# Patient Record
Sex: FEMALE | Race: WHITE | HISPANIC OR LATINO
[De-identification: names, ages, dates, MRNs, and addresses within clinical notes are randomized per-mention and may not be internally consistent; named-entity substitution may affect disease eponyms.]

---

## 2017-08-23 PROBLEM — Z00.00 ENCOUNTER FOR PREVENTIVE HEALTH EXAMINATION: Status: ACTIVE | Noted: 2017-08-23

## 2017-09-01 ENCOUNTER — APPOINTMENT (OUTPATIENT)
Dept: GASTROENTEROLOGY | Facility: CLINIC | Age: 42
End: 2017-09-01

## 2019-04-18 ENCOUNTER — EMERGENCY (EMERGENCY)
Facility: HOSPITAL | Age: 44
LOS: 1 days | Discharge: LEFT BEFORE DISCHARGE | End: 2019-04-18
Attending: EMERGENCY MEDICINE | Admitting: INTERNAL MEDICINE
Payer: COMMERCIAL

## 2019-04-18 ENCOUNTER — TRANSCRIPTION ENCOUNTER (OUTPATIENT)
Age: 44
End: 2019-04-18

## 2019-04-18 VITALS
RESPIRATION RATE: 78 BRPM | SYSTOLIC BLOOD PRESSURE: 135 MMHG | TEMPERATURE: 97 F | OXYGEN SATURATION: 95 % | DIASTOLIC BLOOD PRESSURE: 94 MMHG | HEART RATE: 117 BPM

## 2019-04-18 VITALS
OXYGEN SATURATION: 99 % | HEART RATE: 73 BPM | RESPIRATION RATE: 16 BRPM | DIASTOLIC BLOOD PRESSURE: 77 MMHG | SYSTOLIC BLOOD PRESSURE: 116 MMHG

## 2019-04-18 DIAGNOSIS — R07.9 CHEST PAIN, UNSPECIFIED: ICD-10-CM

## 2019-04-18 LAB
ALBUMIN SERPL ELPH-MCNC: 4.6 G/DL — SIGNIFICANT CHANGE UP (ref 3.3–5)
ALP SERPL-CCNC: 35 U/L — LOW (ref 40–120)
ALT FLD-CCNC: 7 U/L — SIGNIFICANT CHANGE UP (ref 4–33)
ANION GAP SERPL CALC-SCNC: 18 MMO/L — HIGH (ref 7–14)
APTT BLD: 28.1 SEC — SIGNIFICANT CHANGE UP (ref 27.5–36.3)
AST SERPL-CCNC: 19 U/L — SIGNIFICANT CHANGE UP (ref 4–32)
BASE EXCESS BLDV CALC-SCNC: -1.3 MMOL/L — SIGNIFICANT CHANGE UP
BASE EXCESS BLDV CALC-SCNC: -2.3 MMOL/L — SIGNIFICANT CHANGE UP
BASOPHILS # BLD AUTO: 0.04 K/UL — SIGNIFICANT CHANGE UP (ref 0–0.2)
BASOPHILS NFR BLD AUTO: 0.3 % — SIGNIFICANT CHANGE UP (ref 0–2)
BILIRUB SERPL-MCNC: 0.4 MG/DL — SIGNIFICANT CHANGE UP (ref 0.2–1.2)
BLOOD GAS VENOUS - CREATININE: 0.6 MG/DL — SIGNIFICANT CHANGE UP (ref 0.5–1.3)
BLOOD GAS VENOUS - CREATININE: SIGNIFICANT CHANGE UP MG/DL (ref 0.5–1.3)
BUN SERPL-MCNC: 8 MG/DL — SIGNIFICANT CHANGE UP (ref 7–23)
CALCIUM SERPL-MCNC: 9.1 MG/DL — SIGNIFICANT CHANGE UP (ref 8.4–10.5)
CHLORIDE BLDV-SCNC: 104 MMOL/L — SIGNIFICANT CHANGE UP (ref 96–108)
CHLORIDE BLDV-SCNC: 108 MMOL/L — SIGNIFICANT CHANGE UP (ref 96–108)
CHLORIDE SERPL-SCNC: 101 MMOL/L — SIGNIFICANT CHANGE UP (ref 98–107)
CO2 SERPL-SCNC: 17 MMOL/L — LOW (ref 22–31)
CREAT SERPL-MCNC: 0.56 MG/DL — SIGNIFICANT CHANGE UP (ref 0.5–1.3)
EOSINOPHIL # BLD AUTO: 0.03 K/UL — SIGNIFICANT CHANGE UP (ref 0–0.5)
EOSINOPHIL NFR BLD AUTO: 0.3 % — SIGNIFICANT CHANGE UP (ref 0–6)
GAS PNL BLDV: 135 MMOL/L — LOW (ref 136–146)
GAS PNL BLDV: 137 MMOL/L — SIGNIFICANT CHANGE UP (ref 136–146)
GLUCOSE BLDV-MCNC: 104 — HIGH (ref 70–99)
GLUCOSE BLDV-MCNC: 89 — SIGNIFICANT CHANGE UP (ref 70–99)
GLUCOSE SERPL-MCNC: 101 MG/DL — HIGH (ref 70–99)
HCG SERPL-ACNC: < 5 MIU/ML — SIGNIFICANT CHANGE UP
HCO3 BLDV-SCNC: 24 MMOL/L — SIGNIFICANT CHANGE UP (ref 20–27)
HCO3 BLDV-SCNC: 24 MMOL/L — SIGNIFICANT CHANGE UP (ref 20–27)
HCT VFR BLD CALC: 42.4 % — SIGNIFICANT CHANGE UP (ref 34.5–45)
HCT VFR BLDV CALC: 40.2 % — SIGNIFICANT CHANGE UP (ref 34.5–45)
HCT VFR BLDV CALC: 43.7 % — SIGNIFICANT CHANGE UP (ref 34.5–45)
HGB BLD-MCNC: 14.4 G/DL — SIGNIFICANT CHANGE UP (ref 11.5–15.5)
HGB BLDV-MCNC: 13.1 G/DL — SIGNIFICANT CHANGE UP (ref 11.5–15.5)
HGB BLDV-MCNC: 14.3 G/DL — SIGNIFICANT CHANGE UP (ref 11.5–15.5)
IMM GRANULOCYTES NFR BLD AUTO: 0.4 % — SIGNIFICANT CHANGE UP (ref 0–1.5)
INR BLD: 0.82 — LOW (ref 0.88–1.17)
LACTATE BLDV-MCNC: 1.8 MMOL/L — SIGNIFICANT CHANGE UP (ref 0.5–2)
LACTATE BLDV-MCNC: 3.8 MMOL/L — HIGH (ref 0.5–2)
LYMPHOCYTES # BLD AUTO: 2.5 K/UL — SIGNIFICANT CHANGE UP (ref 1–3.3)
LYMPHOCYTES # BLD AUTO: 21.2 % — SIGNIFICANT CHANGE UP (ref 13–44)
MCHC RBC-ENTMCNC: 32.9 PG — SIGNIFICANT CHANGE UP (ref 27–34)
MCHC RBC-ENTMCNC: 34 % — SIGNIFICANT CHANGE UP (ref 32–36)
MCV RBC AUTO: 96.8 FL — SIGNIFICANT CHANGE UP (ref 80–100)
MONOCYTES # BLD AUTO: 0.78 K/UL — SIGNIFICANT CHANGE UP (ref 0–0.9)
MONOCYTES NFR BLD AUTO: 6.6 % — SIGNIFICANT CHANGE UP (ref 2–14)
NEUTROPHILS # BLD AUTO: 8.37 K/UL — HIGH (ref 1.8–7.4)
NEUTROPHILS NFR BLD AUTO: 71.2 % — SIGNIFICANT CHANGE UP (ref 43–77)
NRBC # FLD: 0 K/UL — SIGNIFICANT CHANGE UP (ref 0–0)
NT-PROBNP SERPL-SCNC: 47.33 PG/ML — SIGNIFICANT CHANGE UP
PCO2 BLDV: 25 MMHG — LOW (ref 41–51)
PCO2 BLDV: 32 MMHG — LOW (ref 41–51)
PH BLDV: 7.45 PH — HIGH (ref 7.32–7.43)
PH BLDV: 7.52 PH — HIGH (ref 7.32–7.43)
PLATELET # BLD AUTO: 431 K/UL — HIGH (ref 150–400)
PMV BLD: 9.5 FL — SIGNIFICANT CHANGE UP (ref 7–13)
PO2 BLDV: 46 MMHG — HIGH (ref 35–40)
PO2 BLDV: 47 MMHG — HIGH (ref 35–40)
POTASSIUM BLDV-SCNC: 3.9 MMOL/L — SIGNIFICANT CHANGE UP (ref 3.4–4.5)
POTASSIUM BLDV-SCNC: 4.7 MMOL/L — HIGH (ref 3.4–4.5)
POTASSIUM SERPL-MCNC: 4.3 MMOL/L — SIGNIFICANT CHANGE UP (ref 3.5–5.3)
POTASSIUM SERPL-SCNC: 4.3 MMOL/L — SIGNIFICANT CHANGE UP (ref 3.5–5.3)
PROT SERPL-MCNC: 7.8 G/DL — SIGNIFICANT CHANGE UP (ref 6–8.3)
PROTHROM AB SERPL-ACNC: 9.3 SEC — LOW (ref 9.8–13.1)
RBC # BLD: 4.38 M/UL — SIGNIFICANT CHANGE UP (ref 3.8–5.2)
RBC # FLD: 11.9 % — SIGNIFICANT CHANGE UP (ref 10.3–14.5)
SAO2 % BLDV: 82.2 % — SIGNIFICANT CHANGE UP (ref 60–85)
SAO2 % BLDV: 85.9 % — HIGH (ref 60–85)
SODIUM SERPL-SCNC: 136 MMOL/L — SIGNIFICANT CHANGE UP (ref 135–145)
TROPONIN T, HIGH SENSITIVITY: < 6 NG/L — SIGNIFICANT CHANGE UP (ref ?–14)
TROPONIN T, HIGH SENSITIVITY: < 6 NG/L — SIGNIFICANT CHANGE UP (ref ?–14)
WBC # BLD: 11.77 K/UL — HIGH (ref 3.8–10.5)
WBC # FLD AUTO: 11.77 K/UL — HIGH (ref 3.8–10.5)

## 2019-04-18 PROCEDURE — 70498 CT ANGIOGRAPHY NECK: CPT | Mod: 26

## 2019-04-18 PROCEDURE — 71046 X-RAY EXAM CHEST 2 VIEWS: CPT | Mod: 26

## 2019-04-18 PROCEDURE — 70450 CT HEAD/BRAIN W/O DYE: CPT | Mod: 26,59

## 2019-04-18 PROCEDURE — 99291 CRITICAL CARE FIRST HOUR: CPT

## 2019-04-18 PROCEDURE — 70496 CT ANGIOGRAPHY HEAD: CPT | Mod: 26

## 2019-04-18 RX ORDER — DIAZEPAM 5 MG
10 TABLET ORAL ONCE
Qty: 0 | Refills: 0 | Status: DISCONTINUED | OUTPATIENT
Start: 2019-04-18 | End: 2019-04-18

## 2019-04-18 RX ORDER — HEPARIN SODIUM 5000 [USP'U]/ML
5000 INJECTION INTRAVENOUS; SUBCUTANEOUS EVERY 12 HOURS
Qty: 0 | Refills: 0 | Status: DISCONTINUED | OUTPATIENT
Start: 2019-04-18 | End: 2019-04-18

## 2019-04-18 RX ORDER — ASPIRIN/CALCIUM CARB/MAGNESIUM 324 MG
300 TABLET ORAL DAILY
Qty: 0 | Refills: 0 | Status: DISCONTINUED | OUTPATIENT
Start: 2019-04-18 | End: 2019-04-18

## 2019-04-18 RX ORDER — DIAZEPAM 5 MG
5 TABLET ORAL ONCE
Qty: 0 | Refills: 0 | Status: DISCONTINUED | OUTPATIENT
Start: 2019-04-18 | End: 2019-04-18

## 2019-04-18 RX ORDER — SODIUM CHLORIDE 9 MG/ML
2000 INJECTION INTRAMUSCULAR; INTRAVENOUS; SUBCUTANEOUS ONCE
Qty: 0 | Refills: 0 | Status: COMPLETED | OUTPATIENT
Start: 2019-04-18 | End: 2019-04-18

## 2019-04-18 RX ORDER — FAMOTIDINE 10 MG/ML
20 INJECTION INTRAVENOUS ONCE
Qty: 0 | Refills: 0 | Status: COMPLETED | OUTPATIENT
Start: 2019-04-18 | End: 2019-04-18

## 2019-04-18 RX ORDER — SODIUM CHLORIDE 9 MG/ML
3 INJECTION INTRAMUSCULAR; INTRAVENOUS; SUBCUTANEOUS EVERY 8 HOURS
Qty: 0 | Refills: 0 | Status: DISCONTINUED | OUTPATIENT
Start: 2019-04-18 | End: 2019-04-18

## 2019-04-18 RX ADMIN — FAMOTIDINE 20 MILLIGRAM(S): 10 INJECTION INTRAVENOUS at 15:05

## 2019-04-18 RX ADMIN — SODIUM CHLORIDE 2000 MILLILITER(S): 9 INJECTION INTRAMUSCULAR; INTRAVENOUS; SUBCUTANEOUS at 13:10

## 2019-04-18 RX ADMIN — Medication 5 MILLIGRAM(S): at 13:10

## 2019-04-18 RX ADMIN — Medication 5 MILLIGRAM(S): at 15:53

## 2019-04-18 NOTE — ED PROVIDER NOTE - NS ED ROS FT
GENERAL: No fever, chills  EYES: no vision changes, no discharge.   HEENT: + burning smell and taste.  CARDIAC: + chest pressure, +SOB, no lower ex edema  PULMONARY: no cough, +SOB  GI: no abdominal pain, n/v/d/c  : no dysuria, frequency, hematuria  SKIN: no rashes, lesions, vesicles  NEURO: no headache, + lightheadedness, +room spinning, +paraesthesias.   MSK: No joint pain, myalgia.

## 2019-04-18 NOTE — CONSULT NOTE ADULT - SUBJECTIVE AND OBJECTIVE BOX
Neurology Consult    Name  THANH RAMÍREZ    HPI: 44 y/o  woman PMH of anxiety and migraines presents to the ED with chest tightness and b/l arm and leg numbness/tingling. Patient states that this morning she felt short of breathe and knew something was wrong. She then developed chest tightness and lightheadedness. Also endorses that she smelt burnt rubber. She reports that she has a history of headache but currently does not have one. She also states that she felt very anxious and has a history of anxiety. She states that her arms and legs feel numb/tingling. Denies any vision changes, HA, nausea, vomiting.     NIHSS-2, MRS-0    MEDICATIONS  (STANDING):  diazepam    Tablet 10 milliGRAM(s) Oral Once    MEDICATIONS  (PRN):      Allergies    No Known Allergies    Intolerances        Objective:   Vital Signs Last 24 Hrs  T(C): 36.3 (18 Apr 2019 11:32), Max: 36.3 (18 Apr 2019 11:32)  T(F): 97.4 (18 Apr 2019 11:32), Max: 97.4 (18 Apr 2019 11:32)  HR: 117 (18 Apr 2019 11:32) (117 - 117)  BP: 135/94 (18 Apr 2019 11:32) (135/94 - 135/94)  BP(mean): --  RR: 78 (18 Apr 2019 11:32) (78 - 78)  SpO2: 95% (18 Apr 2019 11:32) (95% - 95%)      General Exam:   General appearance: crying hysterically       Neurological Exam:  Mental Status: AAOx3, fluent speech, follows commands    Cranial Nerves: EOMI,  V1-V3 intact, facial symmetry intact, no dysarthria, tongue midline    Motor: 5/5 throughout. No drift x4    Sensation: Intact to LT throughout, however states all extremities feel "tingly and weird"    Coordination: unable to do FTN, shaking arms and crying      Labs:            CBC Full  -  ( 18 Apr 2019 11:55 )  WBC Count : 11.77 K/uL  RBC Count : 4.38 M/uL  Hemoglobin : 14.4 g/dL  Hematocrit : 42.4 %  Platelet Count - Automated : 431 K/uL  Mean Cell Volume : 96.8 fL  Mean Cell Hemoglobin : 32.9 pg  Mean Cell Hemoglobin Concentration : 34.0 %  Auto Neutrophil # : 8.37 K/uL  Auto Lymphocyte # : 2.50 K/uL  Auto Monocyte # : 0.78 K/uL  Auto Eosinophil # : 0.03 K/uL  Auto Basophil # : 0.04 K/uL  Auto Neutrophil % : 71.2 %  Auto Lymphocyte % : 21.2 %  Auto Monocyte % : 6.6 %  Auto Eosinophil % : 0.3 %  Auto Basophil % : 0.3 %      Radiology  CTH: Unremarkable noncontrast head CT. Neurology Consult    Name  THANH RAMÍREZ    HPI: 42 y/o  woman PMH of anxiety and migraines presents to the ED with chest tightness and b/l arm and leg numbness/tingling. Patient states that this morning she felt short of breathe and knew something was wrong. Pt states she has stressful job and that she missed something for her bosses' biggest client at the [employer] this morning. She then developed chest tightness and lightheadedness. States she was dizzy but denies room spinning sensation. Also endorses that she smelt burnt rubber. She reports that she has a history of headache but currently does not have one. She also states that she felt very anxious and has a history of anxiety. She states that both of her arms and legs feel numb/tingling. Denies any vision changes, HA, nausea, vomiting.     NIHSS-2, MRS-0    MEDICATIONS  (STANDING):  diazepam    Tablet 10 milliGRAM(s) Oral Once    MEDICATIONS  (PRN):      Allergies    No Known Allergies    Intolerances        Objective:   Vital Signs Last 24 Hrs  T(C): 36.3 (18 Apr 2019 11:32), Max: 36.3 (18 Apr 2019 11:32)  T(F): 97.4 (18 Apr 2019 11:32), Max: 97.4 (18 Apr 2019 11:32)  HR: 117 (18 Apr 2019 11:32) (117 - 117)  BP: 135/94 (18 Apr 2019 11:32) (135/94 - 135/94)  BP(mean): --  RR: 78 (18 Apr 2019 11:32) (78 - 78)  SpO2: 95% (18 Apr 2019 11:32) (95% - 95%)      General Exam:   General appearance: crying hysterically       Neurological Exam:  Mental Status: AAOx3, fluent speech, follows commands    Cranial Nerves: EOMI,  V1-V3 intact, facial symmetry intact, no dysarthria, tongue midline    Motor: 5/5 throughout. No drift x4    Sensation: Intact to LT throughout, however states all extremities feel "tingly and weird"    Coordination: unable to do FTN, shaking arms and crying      Labs:            CBC Full  -  ( 18 Apr 2019 11:55 )  WBC Count : 11.77 K/uL  RBC Count : 4.38 M/uL  Hemoglobin : 14.4 g/dL  Hematocrit : 42.4 %  Platelet Count - Automated : 431 K/uL  Mean Cell Volume : 96.8 fL  Mean Cell Hemoglobin : 32.9 pg  Mean Cell Hemoglobin Concentration : 34.0 %  Auto Neutrophil # : 8.37 K/uL  Auto Lymphocyte # : 2.50 K/uL  Auto Monocyte # : 0.78 K/uL  Auto Eosinophil # : 0.03 K/uL  Auto Basophil # : 0.04 K/uL  Auto Neutrophil % : 71.2 %  Auto Lymphocyte % : 21.2 %  Auto Monocyte % : 6.6 %  Auto Eosinophil % : 0.3 %  Auto Basophil % : 0.3 %      Radiology  CTH: Unremarkable noncontrast head CT.

## 2019-04-18 NOTE — DISCHARGE NOTE PROVIDER - CARE PROVIDER_API CALL
Van Marcelino)  Cardiovascular Disease; Interventional Cardiology  [address] [place] [id number]  Phone: [phone]  Fax: [phone]  Follow Up Time:

## 2019-04-18 NOTE — ED ADULT NURSE REASSESSMENT NOTE - NS ED NURSE REASSESS COMMENT FT1
break cvg rn: patient c/o chest tightness, md made aware, pending further orders @ this time, will ctm closely. normal sinus per monitor. resps even and unlabored, spo2 100% on room air speaking in full sentences without difficulty, will ctm closely.

## 2019-04-18 NOTE — SBIRT NOTE. - NSSBIRTFULLSCREEN_GEN_A_ED_FT
Meeting with patient attempted however Full Screen Not Performed due to  pt sleeping and not feeling well

## 2019-04-18 NOTE — ED PROVIDER NOTE - PROGRESS NOTE DETAILS
Patient seen in triage by Dr Swetha Van, stroke code called, HCT ordered. Dr Joelle Chong from neurology at patient bedside.

## 2019-04-18 NOTE — ED PROVIDER NOTE - PHYSICAL EXAMINATION
General: Patient awake alert, anxious affect, hyperventilating.    HEENT: normocephalic, atraumatic, EMOI, PERR, + mild horizontal nystagmus on bilateral gaze. neck supple.  Cardiac: RRR, S1, S2, no murmur, rubs, gallop, equal bilateral radial pulses.   LUNGS: CTA B/L no wheeze, rhonchi, rales.   Abdomen: soft NT, ND, BS all 4Q, no rebound no guarding, no CVA tenderness.   EXT: strength equal in b/l upper and lower extremities, no edema, no calf tenderness.   Neuro: A&Ox3, no focal neurological deficits, CN 2-12 intact, no dysmetria, no pronator drift.   Skin: warm, dry, no rash, no lesions

## 2019-04-18 NOTE — CONSULT NOTE ADULT - ASSESSMENT
42 y/o  woman PMH of anxiety and migraines presents to the ED with chest tightness and b/l arm and leg numbness/tingling. Patient states that this morning she felt short of breathe and knew something was wrong. She then developed chest tightness and lightheadedness, smelled burnt rubber. Denies headache. non-focal neuro exam. CTH negative. tPA not given because very low suspicion of stroke and neuro exam not localizing.    Impression: very low suspicion of stroke. More likely patient is having a panic attack.     Plan:  -Valium 5mg   -CTA H/N pending  -Can follow up with outpatient neurology [phone] 42 y/o  woman PMH of anxiety and migraines presents to the ED with chest tightness and b/l arm and leg numbness/tingling. Patient states that this morning she felt short of breathe and knew something was wrong. She then developed chest tightness and lightheadedness, smelled burnt rubber. Denies headache. non-focal neuro exam. CTH negative. tPA not given because very low suspicion of stroke and neuro exam not localizing.    Impression: very low suspicion of stroke. More likely patient is having a panic attack.     Plan:  -CTA H/N  -Valium 5mg   -Can follow up with outpatient neurology [phone]

## 2019-04-18 NOTE — ED PROVIDER NOTE - CLINICAL SUMMARY MEDICAL DECISION MAKING FREE TEXT BOX
43F  pw chest heaviness, room spinning, L sided weakness and tingling, SOB. Symptoms initial L sided-later bilateral. DDx: Stroke, posterior stroke, TIA, panic attack, dissection, PE. stroke protocol with trops. Dispo pending workup, likely CDU for MRI. 43F  pw chest heaviness, room spinning, L sided weakness and tingling, SOB. Symptoms initial L sided-later bilateral. DDx: Stroke, posterior stroke, TIA, panic attack. stroke protocol with trops. Dispo pending workup, likely CDU for MRI. 43F  pw chest heaviness, room spinning, L sided weakness and tingling, SOB. Symptoms initial L sided-later bilateral. DDx: Stroke, posterior stroke, TIA, panic attack. Low suspicion for ACS but cannot r/o given chest pressure and exertional SOB, will obtain trop and EKG x2. Moderate concern for panic attack given evolving and now b/l symptoms in the setting of stressors at work and at home. Workup per stroke protocol. Dispo pending workup, likely CDU for MRI.

## 2019-04-18 NOTE — ED ADULT TRIAGE NOTE - CHIEF COMPLAINT QUOTE
Pt c/o chest tightness, nausea, dizziness, sob more on exertion since this am..10:30am.  During  triage , pt state she's having a smell of burnt /smoke, tingling to L side.  Denies headache, blurry vision. MD Van called for eval. Code stroke called

## 2019-04-18 NOTE — ED ADULT NURSE NOTE - OBJECTIVE STATEMENT
states" I am having tingling in my arms and legs and I have a head ache and feel numb all over the body since this morning". patient is hyper ventilating and c/o tingling all over the body .NAD CM shows NSR. code stroke called by MD. labs done as ordered. report given to primary RN

## 2019-04-18 NOTE — ED ADULT NURSE NOTE - HOW MANY DRINKS CONTAINING ALCOHOL DO YOU HAVE ON A TYPICAL DAY WHEN YOU ARE DRINKING?
Order received for PT evaluation, however he is currently working with OT. Will re-attempt as available.   3 or 4

## 2019-04-18 NOTE — ED PROVIDER NOTE - ATTENDING CONTRIBUTION TO CARE
43F h/o migraines presents from work for exertional chest pressure, SOB, AARON, vertigo, paresthesias, anosmia. Works in high stress environment as a . Patient was evaluated in triage by Dr Swetha Van and stroke protocol was initiated. Neurology saw patient in triage and ordered head CT with CTA. Patient symptoms are improving. CP and SOB resolved. Still having paresthesias bilaterally that are more on the left side.    Patient denies headache, vision changes, focal weakness/numbness, neck pain, fever, cough, back pain, abd pain, nausea, vomiting, diarrhea, constipation, blood in stool, dysuria, rash, trauma, falls. Patient is well appearing, conversant, cooperative, smiling, head atraumatic, neck supple with full range of motion, no carotid bruits, no spinal tenderness, oropharynx clear, lungs clear, no crackles or rales, speaking full sentences, heart clear, no murmurs, distal pulses equal in all 4 extremities, abdomen soft nontender nondistended with no masses, no leg edema, no calf tenderness, neuro exam positive for horizontal nystagmus and slight dysmetria with left finger to nose and left heel to shin, rest of neuro exam is normal. No acute signs of trauma, infection, sepsis, toxidrome, intoxication.    The patient's risk factors for ACS were reviewed as well as the EKG.  The CXR assists in r/o Pneumonia, Pneumothorax, Esophageal Tears.  The patient does not appear to have a Pulmonary Embolism based on the Wells Score and there is no apparent DVT.  There are no signs of Pericarditis, Endocarditis, or Myocarditis based on risk factor analysis.  There is no fever.  There does not appear to be an Aortic Dissection either based on history, physical exam, and signs. Concern for CVA, TIA, complex migraine, ACS, or panic attack. Neurology consult, neuroimaging, serial ekg, troponin, cardiac monitor, reassess.

## 2019-04-18 NOTE — ED PROVIDER NOTE - OBJECTIVE STATEMENT
43F  pw chest heaviness, room spinning, L sided weakness and tingling, SOB. Last night pt had argument with boyfriend, but went to bed feeling fine. At 5AM, pt woke up feeling chest heaviness and SOB. While running up down [employer] steps, Pt felt lightheaded, room spinning dizziness, right arm and leg paresthesia. While on her way to ED, pt sensed burning taste and smell. Pt states she has stressful job and that she missed something for her bosses' biggest client at the [employer] this AM. In the ED, stroke code called for left sided paresthesia, neuro at bedside. 43F  pw chest heaviness, room spinning, L sided weakness and tingling, SOB. Last night pt had argument with boyfriend, but went to bed feeling fine. At 5AM, pt woke up feeling chest heaviness and SOB. While running up down [employer] steps, Pt felt lightheaded, room spinning dizziness, right arm and leg paresthesia. While on her way to ED, pt sensed burning taste and smell. Pt states she has stressful job and that she missed something for her bosses' biggest client at the [employer] this AM. Endorse drinking, denies illicit drug use.   In the ED, stroke code called for initial left sided paresthesia. At evaluation, paresthesia now bilateral. 43F  pw chest heaviness, room spinning, L sided weakness and tingling, SOB. Last night pt had argument with boyfriend, but went to bed feeling fine. At 5AM, pt woke up feeling chest heaviness and SOB. While running up down [employer] steps, Pt felt lightheaded, room spinning dizziness, right arm and leg paresthesia and SOB. While on her way to ED, pt sensed burning taste and smell. Pt states she has stressful job and that she missed something for her bosses' biggest client at the [employer] this AM. Endorse drinking, denies illicit drug use.   In the ED, stroke code called for initial left sided paresthesia. At evaluation, paresthesia now bilateral.

## 2023-09-07 PROBLEM — G43.909 MIGRAINE, UNSPECIFIED, NOT INTRACTABLE, WITHOUT STATUS MIGRAINOSUS: Chronic | Status: ACTIVE | Noted: 2019-04-18

## 2023-10-04 ENCOUNTER — APPOINTMENT (OUTPATIENT)
Dept: OBGYN | Facility: CLINIC | Age: 48
End: 2023-10-04
Payer: COMMERCIAL

## 2023-10-04 VITALS
WEIGHT: 154 LBS | DIASTOLIC BLOOD PRESSURE: 80 MMHG | SYSTOLIC BLOOD PRESSURE: 122 MMHG | HEIGHT: 62 IN | BODY MASS INDEX: 28.34 KG/M2

## 2023-10-04 DIAGNOSIS — N92.6 IRREGULAR MENSTRUATION, UNSPECIFIED: ICD-10-CM

## 2023-10-04 DIAGNOSIS — Z01.419 ENCOUNTER FOR GYNECOLOGICAL EXAMINATION (GENERAL) (ROUTINE) W/OUT ABNORMAL FINDINGS: ICD-10-CM

## 2023-10-04 PROCEDURE — 99386 PREV VISIT NEW AGE 40-64: CPT

## 2023-10-05 LAB
C TRACH RRNA SPEC QL NAA+PROBE: NOT DETECTED
HPV HIGH+LOW RISK DNA PNL CVX: NOT DETECTED
N GONORRHOEA RRNA SPEC QL NAA+PROBE: NOT DETECTED
SOURCE TP AMPLIFICATION: NORMAL

## 2023-10-09 LAB — CYTOLOGY CVX/VAG DOC THIN PREP: ABNORMAL

## 2023-12-13 ENCOUNTER — NON-APPOINTMENT (OUTPATIENT)
Age: 48
End: 2023-12-13

## 2024-05-28 ENCOUNTER — APPOINTMENT (OUTPATIENT)
Dept: OBGYN | Facility: CLINIC | Age: 49
End: 2024-05-28

## 2024-09-13 ENCOUNTER — APPOINTMENT (OUTPATIENT)
Dept: OBGYN | Facility: CLINIC | Age: 49
End: 2024-09-13

## 2025-03-05 ENCOUNTER — LABORATORY RESULT (OUTPATIENT)
Age: 50
End: 2025-03-05

## 2025-03-05 ENCOUNTER — APPOINTMENT (OUTPATIENT)
Dept: OBGYN | Facility: CLINIC | Age: 50
End: 2025-03-05
Payer: COMMERCIAL

## 2025-03-05 VITALS
WEIGHT: 147 LBS | HEIGHT: 62 IN | SYSTOLIC BLOOD PRESSURE: 120 MMHG | DIASTOLIC BLOOD PRESSURE: 70 MMHG | BODY MASS INDEX: 27.05 KG/M2

## 2025-03-05 DIAGNOSIS — Z01.419 ENCOUNTER FOR GYNECOLOGICAL EXAMINATION (GENERAL) (ROUTINE) W/OUT ABNORMAL FINDINGS: ICD-10-CM

## 2025-03-05 PROCEDURE — 99459 PELVIC EXAMINATION: CPT

## 2025-03-05 PROCEDURE — 99396 PREV VISIT EST AGE 40-64: CPT

## 2025-03-06 ENCOUNTER — NON-APPOINTMENT (OUTPATIENT)
Age: 50
End: 2025-03-06